# Patient Record
Sex: FEMALE
[De-identification: names, ages, dates, MRNs, and addresses within clinical notes are randomized per-mention and may not be internally consistent; named-entity substitution may affect disease eponyms.]

---

## 2023-10-16 ENCOUNTER — P2P PATIENT RECORD (OUTPATIENT)
Age: 53
End: 2023-10-16

## 2023-11-17 ENCOUNTER — OFFICE VISIT (OUTPATIENT)
Dept: URBAN - METROPOLITAN AREA SURGERY CENTER 15 | Facility: SURGERY CENTER | Age: 53
End: 2023-11-17

## 2023-12-21 ENCOUNTER — OFFICE VISIT (OUTPATIENT)
Dept: URBAN - METROPOLITAN AREA CLINIC 78 | Facility: CLINIC | Age: 53
End: 2023-12-21

## 2025-06-09 ENCOUNTER — DASHBOARD ENCOUNTERS (OUTPATIENT)
Age: 55
End: 2025-06-09

## 2025-06-09 ENCOUNTER — OFFICE VISIT (OUTPATIENT)
Dept: URBAN - METROPOLITAN AREA CLINIC 78 | Facility: CLINIC | Age: 55
End: 2025-06-09
Payer: COMMERCIAL

## 2025-06-09 DIAGNOSIS — R10.13 EPIGASTRIC PAIN: ICD-10-CM

## 2025-06-09 DIAGNOSIS — R19.4 CHANGE IN BOWEL HABIT: ICD-10-CM

## 2025-06-09 DIAGNOSIS — K76.0 FATTY LIVER: ICD-10-CM

## 2025-06-09 DIAGNOSIS — Z12.11 SCREENING FOR COLON CANCER: ICD-10-CM

## 2025-06-09 PROBLEM — 162031009: Status: ACTIVE | Noted: 2025-06-09

## 2025-06-09 PROBLEM — 197321007: Status: ACTIVE | Noted: 2025-06-09

## 2025-06-09 PROBLEM — 305058001: Status: ACTIVE | Noted: 2025-06-09

## 2025-06-09 PROBLEM — 79922009: Status: ACTIVE | Noted: 2025-06-09

## 2025-06-09 PROCEDURE — 99203 OFFICE O/P NEW LOW 30 MIN: CPT

## 2025-06-09 PROCEDURE — 99243 OFF/OP CNSLTJ NEW/EST LOW 30: CPT

## 2025-06-09 RX ORDER — OMEPRAZOLE 40 MG/1
1 CAPSULE 1/2 TO 1 HOUR BEFORE MORNING MEAL CAPSULE, DELAYED RELEASE ORAL ONCE A DAY
Qty: 30 | OUTPATIENT
Start: 2025-06-09

## 2025-06-09 RX ORDER — MONTELUKAST 10 MG/1
TABLET, FILM COATED ORAL
Qty: 30 TABLET | Status: ACTIVE | COMMUNITY

## 2025-06-09 RX ORDER — ALBUTEROL SULFATE AND BUDESONIDE 90; 80 UG/1; UG/1
AEROSOL, METERED RESPIRATORY (INHALATION)
Qty: 10.7 GRAM | Status: ON HOLD | COMMUNITY

## 2025-06-09 NOTE — HPI-TODAY'S VISIT:
54-year-old female, new patient, presents at the referral of Dr. Joe Alarcon to Dr. Meneses for colonoscopy consult.  She is her with her sister who translates from [place].  She has been having sharp/stabbing 6/10 epigastric pain [id number].  It is intermittent, lasting for couple of hours, triggered by eating spicy meals and goes away gradually.  She deny N/V/GERD/dysphagia/abn weight loss/change in BH.  She have a BM QD, no blood or mucus seen in the stool.  She reports intermittent diarrhea/constipation, but says that this is normal for her.  She also notes that she gets repeat u/s and blood work of her liver, but is unable to say why. She reports that her BW shows elevated liver enzyme but her U/S have been normal. She says that she has never been tx w medication for this, and that there is no need.  Prior EGD: 2 years ago, normal, repeat in 2 years. Prior Colonscopy:  2 years ago, diverticulosis,no polyps, in [place] Family Hx: none Prior Abdominal surgeries: none CP/SOB: none Recent Cardiology or Pulmonology Eval: none Use of BT/NSAID/ GLP1 use: none

## 2025-06-09 NOTE — PHYSICAL EXAM GASTROINTESTINAL
Abdomen, soft, epigastric pain, nondistended, normal bowel sounds Spontaneous, unlabored and symmetrical

## 2025-06-12 ENCOUNTER — OFFICE VISIT (OUTPATIENT)
Dept: URBAN - METROPOLITAN AREA SURGERY CENTER 15 | Facility: SURGERY CENTER | Age: 55
End: 2025-06-12

## 2025-06-25 ENCOUNTER — TELEPHONE ENCOUNTER (OUTPATIENT)
Dept: URBAN - METROPOLITAN AREA CLINIC 78 | Facility: CLINIC | Age: 55
End: 2025-06-25

## 2025-06-25 ENCOUNTER — OFFICE VISIT (OUTPATIENT)
Dept: URBAN - METROPOLITAN AREA SURGERY CENTER 15 | Facility: SURGERY CENTER | Age: 55
End: 2025-06-25

## 2025-06-25 ENCOUNTER — LAB OUTSIDE AN ENCOUNTER (OUTPATIENT)
Dept: URBAN - METROPOLITAN AREA CLINIC 78 | Facility: CLINIC | Age: 55
End: 2025-06-25

## 2025-06-25 ENCOUNTER — CLAIMS CREATED FROM THE CLAIM WINDOW (OUTPATIENT)
Dept: URBAN - METROPOLITAN AREA CLINIC 4 | Facility: CLINIC | Age: 55
End: 2025-06-25
Payer: COMMERCIAL

## 2025-06-25 DIAGNOSIS — K29.70 GASTRITIS, UNSPECIFIED, WITHOUT BLEEDING: ICD-10-CM

## 2025-06-25 PROBLEM — 429090009: Status: ACTIVE | Noted: 2025-06-25

## 2025-06-25 PROCEDURE — 88312 SPECIAL STAINS GROUP 1: CPT | Performed by: PATHOLOGY

## 2025-06-25 PROCEDURE — 88305 TISSUE EXAM BY PATHOLOGIST: CPT | Performed by: PATHOLOGY

## 2025-06-25 RX ORDER — MONTELUKAST 10 MG/1
TABLET, FILM COATED ORAL
Qty: 30 TABLET | Status: ACTIVE | COMMUNITY

## 2025-06-25 RX ORDER — ALBUTEROL SULFATE AND BUDESONIDE 90; 80 UG/1; UG/1
AEROSOL, METERED RESPIRATORY (INHALATION)
Qty: 10.7 GRAM | Status: ON HOLD | COMMUNITY

## 2025-06-25 RX ORDER — OMEPRAZOLE 40 MG/1
1 CAPSULE 1/2 TO 1 HOUR BEFORE MORNING MEAL CAPSULE, DELAYED RELEASE ORAL ONCE A DAY
Qty: 30 | Status: ACTIVE | COMMUNITY
Start: 2025-06-09

## 2025-07-23 ENCOUNTER — LAB OUTSIDE AN ENCOUNTER (OUTPATIENT)
Dept: URBAN - METROPOLITAN AREA CLINIC 78 | Facility: CLINIC | Age: 55
End: 2025-07-23

## 2025-07-23 ENCOUNTER — TELEPHONE ENCOUNTER (OUTPATIENT)
Dept: URBAN - METROPOLITAN AREA CLINIC 78 | Facility: CLINIC | Age: 55
End: 2025-07-23

## 2025-08-21 ENCOUNTER — OFFICE VISIT (OUTPATIENT)
Dept: URBAN - METROPOLITAN AREA CLINIC 78 | Facility: CLINIC | Age: 55
End: 2025-08-21
Payer: COMMERCIAL

## 2025-08-21 DIAGNOSIS — K76.9 LIVER LESION: ICD-10-CM

## 2025-08-21 DIAGNOSIS — R14.0 BLOATING: ICD-10-CM

## 2025-08-21 DIAGNOSIS — K21.9 CHRONIC GERD: ICD-10-CM

## 2025-08-21 PROCEDURE — 99214 OFFICE O/P EST MOD 30 MIN: CPT | Performed by: INTERNAL MEDICINE

## 2025-08-21 RX ORDER — OMEPRAZOLE 40 MG/1
1 CAPSULE 1/2 TO 1 HOUR BEFORE MORNING MEAL CAPSULE, DELAYED RELEASE ORAL ONCE A DAY
Qty: 30 | Status: ACTIVE | COMMUNITY
Start: 2025-06-09

## 2025-08-21 RX ORDER — MONTELUKAST 10 MG/1
TABLET, FILM COATED ORAL
Qty: 30 TABLET | Status: ACTIVE | COMMUNITY

## 2025-08-21 RX ORDER — PANTOPRAZOLE SODIUM 40 MG/1
1 TABLET BEFORE A MEAL TABLET, DELAYED RELEASE ORAL ONCE A DAY
Qty: 90 TABLET | Refills: 3 | OUTPATIENT
Start: 2025-08-21